# Patient Record
Sex: MALE | Race: OTHER
[De-identification: names, ages, dates, MRNs, and addresses within clinical notes are randomized per-mention and may not be internally consistent; named-entity substitution may affect disease eponyms.]

---

## 2018-07-14 ENCOUNTER — HOSPITAL ENCOUNTER (EMERGENCY)
Dept: HOSPITAL 25 - UCEAST | Age: 78
Discharge: HOME | End: 2018-07-14
Payer: MEDICARE

## 2018-07-14 ENCOUNTER — HOSPITAL ENCOUNTER (EMERGENCY)
Dept: HOSPITAL 25 - ED | Age: 78
Discharge: HOME | End: 2018-07-14
Payer: MEDICARE

## 2018-07-14 VITALS — SYSTOLIC BLOOD PRESSURE: 139 MMHG | DIASTOLIC BLOOD PRESSURE: 78 MMHG

## 2018-07-14 VITALS — DIASTOLIC BLOOD PRESSURE: 69 MMHG | SYSTOLIC BLOOD PRESSURE: 114 MMHG

## 2018-07-14 DIAGNOSIS — R79.89: ICD-10-CM

## 2018-07-14 DIAGNOSIS — Z95.5: ICD-10-CM

## 2018-07-14 DIAGNOSIS — R50.9: Primary | ICD-10-CM

## 2018-07-14 DIAGNOSIS — Z88.2: ICD-10-CM

## 2018-07-14 DIAGNOSIS — I10: ICD-10-CM

## 2018-07-14 DIAGNOSIS — K21.9: ICD-10-CM

## 2018-07-14 DIAGNOSIS — D64.9: ICD-10-CM

## 2018-07-14 DIAGNOSIS — I25.10: ICD-10-CM

## 2018-07-14 DIAGNOSIS — Z85.828: ICD-10-CM

## 2018-07-14 DIAGNOSIS — G47.30: ICD-10-CM

## 2018-07-14 DIAGNOSIS — C85.90: ICD-10-CM

## 2018-07-14 DIAGNOSIS — N40.0: ICD-10-CM

## 2018-07-14 DIAGNOSIS — M79.642: ICD-10-CM

## 2018-07-14 DIAGNOSIS — E78.5: ICD-10-CM

## 2018-07-14 DIAGNOSIS — Z85.830: ICD-10-CM

## 2018-07-14 DIAGNOSIS — I45.10: ICD-10-CM

## 2018-07-14 LAB
BASOPHILS # BLD AUTO: 0.1 10^3/UL (ref 0–0.2)
EOSINOPHIL # BLD AUTO: 0.1 10^3/UL (ref 0–0.6)
HCT VFR BLD AUTO: 33 % (ref 42–52)
HGB BLD-MCNC: 11.4 G/DL (ref 14–18)
INR PPP/BLD: 1.21 (ref 0.77–1.02)
LYMPHOCYTES # BLD AUTO: 1.1 10^3/UL (ref 1–4.8)
MCH RBC QN AUTO: 33 PG (ref 27–31)
MCHC RBC AUTO-ENTMCNC: 35 G/DL (ref 31–36)
MCV RBC AUTO: 95 FL (ref 80–94)
MONOCYTES # BLD AUTO: 1.1 10^3/UL (ref 0–0.8)
NEUTROPHILS # BLD AUTO: 6.3 10^3/UL (ref 1.5–7.7)
NRBC # BLD AUTO: 0 10^3/UL
NRBC BLD QL AUTO: 0
PLATELET # BLD AUTO: 124 10^3/UL (ref 150–450)
RBC # BLD AUTO: 3.45 10^6/UL (ref 4–5.4)
WBC # BLD AUTO: 8.6 10^3/UL (ref 3.5–10.8)

## 2018-07-14 PROCEDURE — 85610 PROTHROMBIN TIME: CPT

## 2018-07-14 PROCEDURE — 81003 URINALYSIS AUTO W/O SCOPE: CPT

## 2018-07-14 PROCEDURE — 86140 C-REACTIVE PROTEIN: CPT

## 2018-07-14 PROCEDURE — 85025 COMPLETE CBC W/AUTO DIFF WBC: CPT

## 2018-07-14 PROCEDURE — 96360 HYDRATION IV INFUSION INIT: CPT

## 2018-07-14 PROCEDURE — 80053 COMPREHEN METABOLIC PANEL: CPT

## 2018-07-14 PROCEDURE — 71046 X-RAY EXAM CHEST 2 VIEWS: CPT

## 2018-07-14 PROCEDURE — 99282 EMERGENCY DEPT VISIT SF MDM: CPT

## 2018-07-14 PROCEDURE — G0463 HOSPITAL OUTPT CLINIC VISIT: HCPCS

## 2018-07-14 PROCEDURE — 85730 THROMBOPLASTIN TIME PARTIAL: CPT

## 2018-07-14 PROCEDURE — 84484 ASSAY OF TROPONIN QUANT: CPT

## 2018-07-14 PROCEDURE — 93005 ELECTROCARDIOGRAM TRACING: CPT

## 2018-07-14 PROCEDURE — 36415 COLL VENOUS BLD VENIPUNCTURE: CPT

## 2018-07-14 PROCEDURE — 83605 ASSAY OF LACTIC ACID: CPT

## 2018-07-14 PROCEDURE — 99212 OFFICE O/P EST SF 10 MIN: CPT

## 2018-07-14 PROCEDURE — 87040 BLOOD CULTURE FOR BACTERIA: CPT

## 2018-07-14 NOTE — ED
HPI Febrile Illness





- HPI Summary


HPI Summary: 





This patient is a 77 year old M presenting to [facility] with a chief complaint of 

fever since yesterday 7/13/18. Endorses fever of 101 7/13/18, lower but still 

present today. Pt endorses taking amoxicillin for 2 weeks for cellulitis of 

hand. Sx were alleviated, but returned when he stopped taking the ABx. Pt takes 

oral chemotherapy drug revlomid. Endorses PMHx staph. Endorses thumb and wrist 

pain, congestion in lungs. Denies urinary sx. PMHx [place], multiple myeloma.





- History of Current Complaint


Chief Complaint: EDFever


Time Seen by Provider: 07/14/18 16:58


Hx Obtained From: Patient


Onset/Duration: Started Days Ago, Still Present


Timing: Constant


Initial Severity: Mild


Current Severity: Mild


Pain Intensity: 3


Pain Scale Used: 0-10 Numeric


Alleviating Factors: Nothing


Associated Signs and Symptoms: Joint Pain, Myalgia, Rash, Other: - chest 

congestion





- Allergy/Home Medications


Allergies/Adverse Reactions: 


 Allergies











Allergy/AdvReac Type Severity Reaction Status Date / Time


 


sulfamethoxazole Allergy  See Comment Verified 07/14/18 15:50





[From Bactrim]     


 


trimethoprim [From Bactrim] Allergy  See Comment Verified 07/14/18 15:50














PMH/Surg Hx/FS Hx/Imm Hx


Endocrine/Hematology History: Reports: Hx Anemia - SLIGHTLY ANEMIC


Cardiovascular History: Reports: Hx Coronary Artery Disease, Hx Hypertension - 

ON MEDICATION FOR


   Denies: Hx Pacemaker/ICD


Respiratory History: Reports: Hx Sleep Apnea - uses Bipap


GI History: Reports: Hx Gastroesophageal Reflux Disease - ON MEDICATION, Hx 

Irritable Bowel, Other GI Disorders - LIVER ENZYME OFF PATIENT STATES THEY ARE 

MONITORED


 History: Reports: Hx Benign Prostatic Hyperplasia, Other  Problems/

Disorders - BPH


Musculoskeletal History: Reports: Hx Arthritis - BACK


   Denies: Hx Osteoporosis


Sensory History: Reports: Hx Contacts or Glasses, Hx Vision Problem, Hx Hearing 

Aid - BOTH EARS, REMOVED, Hx Hearing Problem


Opthamlomology History: Reports: Hx Contacts or Glasses, Hx Vision Problem


EENT History: Reports: Hx Hearing Problem, Hx Hearing Aid


Psychiatric History: 


   Denies: Hx Panic Disorder





- Cancer History


Cancer Type, Location and Year: BONE [place] 12/2013, multiple myeloma


Hx Chemotherapy: Yes


Hx Radiation Therapy: No


Hx Palliative Cancer Treatment: No





- Surgical History


Surgery Procedure, Year, and Place: TWO CARDIAC STENTS PLACED 2004.  BILATERAL 

CARPAL TUNNEL.  BILATERAL KNEES SCOPED, TONSILECTOMY.  LUMP REMOVED FROM ABDOMEN


Hx Anesthesia Reactions: No


Infectious Disease History: No


Infectious Disease History: 


   Denies: Traveled Outside the [place] in Last 30 Days





- Family History


Known Family History: 


   Negative: Blood Disorder





- Social History


Occupation: Retired


Alcohol Use: None


Substance Use Type: Reports: None


Smoking Status (MU): Never Smoked Tobacco





Review of Systems


Positive: Fever


Positive: Other - chest congestion


Negative: burning, dysuria, discharge, frequency, hematuria, incontinence, 

urgency


Positive: Arthralgia, Myalgia, Edema


Positive: Rash


All Other Systems Reviewed And Are Negative: Yes





Physical Exam





- Summary


Physical Exam Summary: 





Appearance: Well appearing, no pain distress


Skin: hot to the touch, dry, reflects adequate perfusion, thickened tight skin 

and cracked skin on both palms. Eczematous changes.


Head/face: normal


Eyes: EOMI, MONSERRAT


ENT: normal, moist mucous membranes


Neck: supple, non-tender


Respiratory: CTA, breath sounds present


Cardiovascular: RRR, pulses symmetrical 


Abdomen: non-tender, soft


Bowel Sounds: present


Musculoskeletal: normal, strength/ROM intact


Neuro: normal, sensory motor intact, A&Ox3


Triage Information Reviewed: Yes


Vital Signs On Initial Exam: 


 Initial Vitals











Temp Pulse Resp BP Pulse Ox


 


 99.8 F   60   16   150/61   96 


 


 07/14/18 15:46  07/14/18 15:46  07/14/18 15:46  07/14/18 15:46  07/14/18 15:46











Vital Signs Reviewed: Yes





Diagnostics





- Vital Signs


 Vital Signs











  Temp Pulse Resp BP Pulse Ox


 


 07/14/18 15:46  99.8 F  60  16  150/61  96














- Laboratory


Result Diagrams: 


 07/14/18 17:43





 07/14/18 17:43


Lab Statement: Any lab studies that have been ordered have been reviewed, and 

results considered in the medical decision making process.





- Radiology


  ** CXR


Xray Interpretation: No Acute Changes


Radiology Interpretation Completed By: Radiologist - No radiographic evidence 

of acute cardiopulmonary disease. Dr. Anderson has reviewed this report.





  ** Hand XR


Xray Interpretation: No Acute Changes


Radiology Interpretation Completed By: Radiologist - Age-appropriate 

degenerative changes as described above without radiographically apparent acute 

abnormality. Dr. Anderson has reviewed this report.





  ** Wrist XR


Xray Interpretation: No Acute Changes


Radiology Interpretation Completed By: Radiologist - Age-appropriate 

degenerative changes as described above without radiographically apparent acute 

abnormality. Dr. Anderson has reviewed this report.





- EKG


  ** 1710


Cardiac Rate: NL - 60


EKG Rhythm: Sinus Rhythm


ST Segment: Non-Specific


EKG Interpretation: borderline LVH, RBBB, nl axis





Re-Evaluation





- Re-Evaluation


  ** First Eval


Re-Evaluation Time: 16:33


Change: Improved


Comment: feels fine, denies fever and hand pain.





Course/Dx





- Course


Course Of Treatment: Patient with fever of 101 last night and history of 

multiple myeloma.  He had recent cellulitis from eczema and his hands in the 

dorsum of the left hand.  The left hand now is non-erythematous and there is no 

joint pain.  He does have an elevated CRP but stable blood counts for him.  

There is no pneumonia on x-ray and his urine is negative.  His troponin is 

incidentally slightly positive.  He's not had any chest pain.  His EKG is 

unchanged.  With his allergist who wishes for him to be started on oral 

Levaquin and discharged to follow up with him on Monday.





- Febrile Illness


Differential Diagnoses: Other: - Fever of unknown origin, bacteremia, cellulitis

, joint infection, gout, pneumonia or UTI





- Diagnoses


Provider Diagnoses: 


 History of multiple myeloma, Hand pain, Fever








- Provider Notifications


Discussed Care Of Patient With: Victorino Cm


Time Discussed With Above Provider: 18:30


Instructed by Provider To: Other - discussed pt being in ED, elevated trop, dx, 

pt care.





Discharge





- Sign-Out/Discharge


Documenting (check all that apply): Patient Departure - discharge





- Discharge Plan


Condition: Good


Disposition: HOME


Prescriptions: 


Levofloxacin TAB* [Levaquin TAB*] 750 mg PO DAILY #6 tab


Patient Education Materials:  Multiple Myeloma (DC), Fever in Adults (ED)


Referrals: 


Victorino Cm MD [Medical Doctor] - 


Additional Instructions: 


Stay well-hydrated.  Tylenol for fever.  Return with joint pain, hand pain, new 

rash, new symptoms, worse or other concern.  Follow-up with Dr. Cm on Monday.





- Billing Disposition and Condition


Condition: GOOD


Disposition: Home

## 2018-07-14 NOTE — RAD
INDICATION: Cellulitis



COMPARISON: None.



TECHNIQUE: 2 views of the left hand and 2 views left wrist were obtained.



FINDINGS:



The adequately corticated bones are in normal alignment. No significant focal osseous

abnormality or fracture is seen. Degenerative changes include sclerotic change at the left

thumb metacarpal trapezium joint as well as mild sclerotic change of the distal radius.

There is narrowing of the interphalangeal joints with a mild degree of marginal osteophyte

formation at the thumb interphalangeal joint.



IMPRESSION:  Age-appropriate degenerative changes as described above without

radiographically apparent acute abnormality.



If the patient's symptoms persist, follow-up imaging is recommended.

## 2023-01-31 NOTE — RAD
INDICATION: Fever in a patient multiple myeloma



COMPARISON: Most recent comparison chest x-rays dated July 21, 2014



TECHNIQUE: PA and lateral views of the chest were obtained.



FINDINGS:



The heart and mediastinum are normal in size and contour.



The lungs are grossly clear. There is no evidence of large pleural effusion.



Visualized bones are normal for the patient's age.



There is no radiographic evidence of free air beneath the diaphragm



IMPRESSION: 

No radiographic evidence of acute cardiopulmonary disease. Quality 110: Preventive Care And Screening: Influenza Immunization: Influenza Immunization previously received during influenza season Detail Level: Detailed

## 2023-07-28 NOTE — UC
Hand/Wrist HPI





- HPI Summary


HPI Summary: 


patient is on chemo for lymphoma, has swelling left hand with fevers last night-

-had similar a few week ago rx with antibiotics and it seem to get better








- History Of Current Complaint


Chief Complaint: UCUpperExtremity


Stated Complaint: WRIST PAIN, FEVER


Time Seen by Provider: 07/14/18 14:52


Hx Obtained From: Patient


Mechanism Of Injury: no injury


Onset/Duration: Gradual Onset, Lasting Days


Pain Intensity: 3


Pain Scale Used: 0-10 Numeric


Alleviating Factor(s): Nothing


Associated Signs And Symptoms: Positive: Swelling - left hand, Redness - left 

hand


Related History: Dominant Hand Right





- Allergies/Home Medications


Allergies/Adverse Reactions: 


 Allergies











Allergy/AdvReac Type Severity Reaction Status Date / Time


 


sulfamethoxazole Allergy  See Comment Verified 07/14/18 15:50





[From Bactrim]     


 


trimethoprim [From Bactrim] Allergy  See Comment Verified 07/14/18 15:50














PMH/Surg Hx/FS Hx/Imm Hx


Previously Healthy: No


Endocrine History: Dyslipidemia


Cardiovascular History: Hypertension


GI/ History: Gastroesophageal Reflux


Other Cancer History: lymphoma





- Surgical History


Surgical History: Yes


Surgery Procedure, Year, and Place: TWO CARDIAC STENTS PLACED 2004.  BILATERAL 

CARPAL TUNNEL.  BILATERAL KNEES SCOPED, TONSILECTOMY.  LUMP REMOVED FROM ABDOMEN





- Family History


Known Family History: Positive: None





- Social History


Occupation: Retired


Lives: With Family


Alcohol Use: None


Substance Use Type: None


Smoking Status (MU): Never Smoked Tobacco





- Immunization History


Most Recent Influenza Vaccination: Nov 2013


Most Recent Tetanus Shot: UTD


Most Recent Pneumonia Vaccination: Nov 2013





Review of Systems


Constitutional: Fever, Chills, Fatigue


Skin: Negative


Eyes: Negative


ENT: Negative


Respiratory: Negative


Cardiovascular: Negative


Gastrointestinal: Negative


Genitourinary: Negative


Motor: Negative


Neurovascular: Negative


Musculoskeletal: Arthralgia - left hand, Edema - left hand swelling


Neurological: Negative


Psychological: Negative


Is Patient Immunocompromised?: Yes


All Other Systems Reviewed And Are Negative: Yes





Physical Exam


Triage Information Reviewed: Yes


Appearance: Well-Appearing, No Pain Distress, Well-Nourished


Vital Signs: 


 Initial Vital Signs











Temp  99.2 F   07/14/18 14:07


 


Pulse  65   07/14/18 14:07


 


Resp  18   07/14/18 14:07


 


BP  114/69   07/14/18 14:07


 


Pulse Ox  100   07/14/18 14:07











Vital Signs Reviewed: Yes


Eye Exam: Normal


Eyes: Positive: Conjunctiva Clear


ENT Exam: Normal


ENT: Positive: Normal ENT inspection, Hearing grossly normal.  Negative: Trismus

, Muffled voice, Hoarse voice


Dental Exam: Normal


Neck exam: Normal


Neck: Positive: Supple, Nontender


Respiratory Exam: Normal


Respiratory: Positive: Chest non-tender, Lungs clear, Normal breath sounds, No 

respiratory distress, No accessory muscle use


Cardiovascular Exam: Normal


Cardiovascular: Positive: RRR, No Murmur, Pulses Normal, Brisk Capillary Refill


Musculoskeletal Exam: Other


Musculoskeletal: Positive: Strength Intact, ROM Intact, Edema @ - left hand


Neurological Exam: Normal


Neurological: Positive: Alert, Muscle Tone Normal


Psychological Exam: Normal


Skin Exam: Normal





Hand/Wrist Course/Dx





- Course


Course Of Treatment: d/c from urgent care to follow in the hospital





- Differential Dx/Diagnosis


Provider Diagnoses: fever,pain swelling left hand, Lymphoma by history





Discharge





- Sign-Out/Discharge


Documenting (check all that apply): Patient Departure





- Discharge Plan


Condition: Stable


Disposition: HOME


Patient Education Materials:  Cellulitis (ED)


Referrals: 


Siddhartha Glasgow MD [Primary Care Provider] - 


Additional Instructions: 


please go directly to Hospital emergency department a [facility].  

With a history of cancer and being on chemotherapy the fact that you have a 

fever is concerning and requires more care than what I can't provide at the 

urgent care





- Billing Disposition and Condition


Condition: STABLE


Disposition: Home supervision